# Patient Record
Sex: MALE | Race: WHITE | NOT HISPANIC OR LATINO | URBAN - METROPOLITAN AREA
[De-identification: names, ages, dates, MRNs, and addresses within clinical notes are randomized per-mention and may not be internally consistent; named-entity substitution may affect disease eponyms.]

---

## 2018-09-19 ENCOUNTER — NEW SKIN PROBLEM (OUTPATIENT)
Dept: URBAN - METROPOLITAN AREA CLINIC 19 | Facility: CLINIC | Age: 63
Setting detail: DERMATOLOGY
End: 2018-09-19

## 2018-09-19 ENCOUNTER — RX ONLY (RX ONLY)
Age: 63
End: 2018-09-19

## 2018-09-19 DIAGNOSIS — D49.2 NEOPLASM OF UNSPECIFIED BEHAVIOR OF BONE, SOFT TISSUE, AND SKIN: ICD-10-CM

## 2018-09-19 PROBLEM — L57.0 ACTINIC KERATOSIS: Status: RESOLVED | Noted: 2018-09-19

## 2018-09-19 PROCEDURE — 99242 OFF/OP CONSLTJ NEW/EST SF 20: CPT

## 2018-09-19 PROCEDURE — 17003 DESTRUCT PREMALG LES 2-14: CPT

## 2018-09-19 PROCEDURE — 17000 DESTRUCT PREMALG LESION: CPT

## 2018-09-19 RX ORDER — CICLOPIROX 1 G/100ML
1 APPLICATION SHAMPOO TOPICAL 2 TIMES WEEKLY
Qty: 120 | Refills: 3
Start: 2018-09-19

## 2018-09-19 RX ORDER — CLINDAMYCIN PHOSPHATE 10 MG/ML
1 ML SOLUTION TOPICAL BID
Qty: 60 | Refills: 3
Start: 2018-09-19

## 2018-11-21 ENCOUNTER — FOLLOW-UP (OUTPATIENT)
Dept: URBAN - METROPOLITAN AREA CLINIC 19 | Facility: CLINIC | Age: 63
Setting detail: DERMATOLOGY
End: 2018-11-21

## 2018-11-21 DIAGNOSIS — L57.0 ACTINIC KERATOSIS: ICD-10-CM

## 2018-11-21 PROBLEM — L82.1 OTHER SEBORRHEIC KERATOSIS: Status: RESOLVED | Noted: 2018-11-21

## 2018-11-21 PROCEDURE — 69100 BIOPSY OF EXTERNAL EAR: CPT

## 2018-11-21 PROCEDURE — 17003 DESTRUCT PREMALG LES 2-14: CPT

## 2018-11-21 PROCEDURE — 11100 BX SKIN SUBCUTANEOUS&/MUCOUS MEMBRANE 1 LESION: CPT

## 2018-11-21 PROCEDURE — 99213 OFFICE O/P EST LOW 20 MIN: CPT

## 2018-11-21 PROCEDURE — 17000 DESTRUCT PREMALG LESION: CPT

## 2019-01-03 ENCOUNTER — CRYOTHERAPY (OUTPATIENT)
Dept: URBAN - METROPOLITAN AREA CLINIC 19 | Facility: CLINIC | Age: 64
Setting detail: DERMATOLOGY
End: 2019-01-03

## 2019-01-03 DIAGNOSIS — L57.0 ACTINIC KERATOSIS: ICD-10-CM

## 2019-01-03 PROCEDURE — 17003 DESTRUCT PREMALG LES 2-14: CPT

## 2019-01-03 PROCEDURE — 17000 DESTRUCT PREMALG LESION: CPT

## 2019-11-20 ENCOUNTER — COMPLETE SKIN EXAM (OUTPATIENT)
Dept: URBAN - METROPOLITAN AREA CLINIC 19 | Facility: CLINIC | Age: 64
Setting detail: DERMATOLOGY
End: 2019-11-20

## 2019-11-20 DIAGNOSIS — Z48.02 ENCOUNTER FOR REMOVAL OF SUTURES: ICD-10-CM

## 2019-11-20 PROBLEM — L57.0 ACTINIC KERATOSIS: Status: RESOLVED | Noted: 2019-11-20

## 2019-11-20 PROBLEM — D18.01 HEMANGIOMA OF SKIN AND SUBCUTANEOUS TISSUE: Status: RESOLVED | Noted: 2019-11-20

## 2019-11-20 PROBLEM — L82.0 INFLAMED SEBORRHEIC KERATOSIS: Status: RESOLVED | Noted: 2019-11-20

## 2019-11-20 PROBLEM — L82.1 OTHER SEBORRHEIC KERATOSIS: Status: RESOLVED | Noted: 2019-11-20

## 2019-11-20 PROCEDURE — 11102 TANGNTL BX SKIN SINGLE LES: CPT

## 2019-11-20 PROCEDURE — 17003 DESTRUCT PREMALG LES 2-14: CPT

## 2019-11-20 PROCEDURE — 99213 OFFICE O/P EST LOW 20 MIN: CPT

## 2019-11-20 PROCEDURE — 17110 DESTRUCT B9 LESION 1-14: CPT

## 2019-11-20 PROCEDURE — 17000 DESTRUCT PREMALG LESION: CPT

## 2020-12-02 ENCOUNTER — COMPLETE SKIN EXAM (OUTPATIENT)
Dept: URBAN - METROPOLITAN AREA CLINIC 19 | Facility: CLINIC | Age: 65
Setting detail: DERMATOLOGY
End: 2020-12-02

## 2020-12-02 DIAGNOSIS — L57.0 ACTINIC KERATOSIS: ICD-10-CM

## 2020-12-02 PROBLEM — D18.01 HEMANGIOMA OF SKIN AND SUBCUTANEOUS TISSUE: Status: RESOLVED | Noted: 2020-12-02

## 2020-12-02 PROBLEM — L82.0 INFLAMED SEBORRHEIC KERATOSIS: Status: RESOLVED | Noted: 2020-12-02

## 2020-12-02 PROBLEM — L82.1 OTHER SEBORRHEIC KERATOSIS: Status: RESOLVED | Noted: 2020-12-02

## 2020-12-02 PROCEDURE — 99213 OFFICE O/P EST LOW 20 MIN: CPT

## 2020-12-02 PROCEDURE — 17000 DESTRUCT PREMALG LESION: CPT

## 2020-12-02 PROCEDURE — 17003 DESTRUCT PREMALG LES 2-14: CPT

## 2020-12-02 PROCEDURE — 17110 DESTRUCT B9 LESION 1-14: CPT

## 2022-01-10 ENCOUNTER — COMPLETE SKIN EXAM (OUTPATIENT)
Dept: URBAN - METROPOLITAN AREA 39 | Facility: CLINIC | Age: 67
Setting detail: DERMATOLOGY
End: 2022-01-10

## 2022-01-10 ENCOUNTER — RX ONLY (RX ONLY)
Age: 67
End: 2022-01-10

## 2022-01-10 DIAGNOSIS — Z41.8 ENCOUNTER FOR OTHER PROCEDURES FOR PURPOSES OTHER THAN REMEDYING HEALTH STATE: ICD-10-CM

## 2022-01-10 PROBLEM — D18.01 HEMANGIOMA OF SKIN AND SUBCUTANEOUS TISSUE: Status: RESOLVED | Noted: 2022-01-10

## 2022-01-10 PROBLEM — L57.0 ACTINIC KERATOSIS: Status: RESOLVED | Noted: 2022-01-10

## 2022-01-10 PROBLEM — L82.1 OTHER SEBORRHEIC KERATOSIS: Status: RESOLVED | Noted: 2022-01-10

## 2022-01-10 PROCEDURE — 99214 OFFICE O/P EST MOD 30 MIN: CPT

## 2022-01-10 PROCEDURE — 17003 DESTRUCT PREMALG LES 2-14: CPT

## 2022-01-10 PROCEDURE — 17000 DESTRUCT PREMALG LESION: CPT

## 2022-01-10 PROCEDURE — 17110 DESTRUCT B9 LESION 1-14: CPT

## 2022-01-10 RX ORDER — FLUOCINONIDE 0.5 MG/ML
SOLUTION TOPICAL
Qty: 60 | Refills: 7
Start: 2022-01-10

## 2023-01-10 ENCOUNTER — APPOINTMENT (OUTPATIENT)
Dept: URBAN - METROPOLITAN AREA SURGERY 13 | Age: 68
Setting detail: DERMATOLOGY
End: 2023-01-10

## 2023-01-10 DIAGNOSIS — L738 OTHER SPECIFIED DISEASES OF HAIR AND HAIR FOLLICLES: ICD-10-CM

## 2023-01-10 DIAGNOSIS — L663 OTHER SPECIFIED DISEASES OF HAIR AND HAIR FOLLICLES: ICD-10-CM

## 2023-01-10 DIAGNOSIS — L57.0 ACTINIC KERATOSIS: ICD-10-CM

## 2023-01-10 PROBLEM — L02.92 FURUNCLE, UNSPECIFIED: Status: ACTIVE | Noted: 2023-01-10

## 2023-01-10 PROCEDURE — OTHER ORDER TESTS: OTHER

## 2023-01-10 PROCEDURE — 99214 OFFICE O/P EST MOD 30 MIN: CPT | Mod: 25

## 2023-01-10 PROCEDURE — 17004 DESTROY PREMAL LESIONS 15/>: CPT

## 2023-01-10 PROCEDURE — OTHER PRESCRIPTION MEDICATION MANAGEMENT: OTHER

## 2023-01-10 PROCEDURE — OTHER LIQUID NITROGEN: OTHER

## 2023-01-10 PROCEDURE — OTHER COUNSELING: OTHER

## 2023-01-10 ASSESSMENT — LOCATION DETAILED DESCRIPTION DERM
LOCATION DETAILED: RIGHT MID TEMPLE
LOCATION DETAILED: LEFT SUPERIOR LATERAL MALAR CHEEK
LOCATION DETAILED: LEFT MEDIAL FOREHEAD
LOCATION DETAILED: LEFT SUPERIOR CENTRAL MALAR CHEEK
LOCATION DETAILED: RIGHT CLAVICULAR NECK
LOCATION DETAILED: RIGHT FOREHEAD
LOCATION DETAILED: RIGHT ANTIHELIX
LOCATION DETAILED: LEFT CLAVICULAR NECK
LOCATION DETAILED: LEFT LATERAL MALAR CHEEK
LOCATION DETAILED: LEFT LATERAL FOREHEAD
LOCATION DETAILED: LEFT ANTIHELIX
LOCATION DETAILED: LEFT LATERAL EYEBROW
LOCATION DETAILED: LEFT MID TEMPLE
LOCATION DETAILED: LEFT FOREHEAD

## 2023-01-10 ASSESSMENT — LOCATION SIMPLE DESCRIPTION DERM
LOCATION SIMPLE: LEFT TEMPLE
LOCATION SIMPLE: RIGHT TEMPLE
LOCATION SIMPLE: LEFT ANTERIOR NECK
LOCATION SIMPLE: RIGHT EAR
LOCATION SIMPLE: LEFT CHEEK
LOCATION SIMPLE: RIGHT ANTERIOR NECK
LOCATION SIMPLE: RIGHT FOREHEAD
LOCATION SIMPLE: LEFT EAR
LOCATION SIMPLE: LEFT EYEBROW
LOCATION SIMPLE: LEFT FOREHEAD

## 2023-01-10 ASSESSMENT — LOCATION ZONE DERM
LOCATION ZONE: EAR
LOCATION ZONE: FACE
LOCATION ZONE: NECK

## 2023-01-10 NOTE — PROCEDURE: COUNSELING
Patient Specific Counseling (Will Not Stick From Patient To Patient): Start CLN shampoo for Folliculitis
Detail Level: Zone

## 2023-01-10 NOTE — PROCEDURE: ORDER TESTS
Expected Date Of Service: 01/10/2023
Performing Laboratory: [id number]
Lab Facility: [id number]
Bill For Surgical Tray: no
Billing Type: Third-Party Bill

## 2023-01-10 NOTE — PROCEDURE: LIQUID NITROGEN
Number Of Freeze-Thaw Cycles: 1 freeze-thaw cycle
Render Post-Care Instructions In Note?: no
Consent: The patient's consent was obtained including but not limited to risks of crusting, scabbing, blistering, scarring, darker or lighter pigmentary change, recurrence, incomplete removal and infection.
Show Aperture Variable?: Yes
Application Tool (Optional): Liquid Nitrogen Sprayer
Post-Care Instructions: I reviewed with the patient in detail post-care instructions. Patient is to wear sunprotection, and avoid picking at any of the treated lesions. Pt may apply Vaseline to crusted or scabbing areas.
Duration Of Freeze Thaw-Cycle (Seconds): 5
Detail Level: Detailed

## 2023-01-10 NOTE — PROCEDURE: PRESCRIPTION MEDICATION MANAGEMENT
Render In Strict Bullet Format?: No
Plan: Chronic folliculitis that did not improve with topical clindamycin, ketaconazole shampoo, or fluocinonide. Will get results of bacterial culture today, then send appropriate oral antibiotic. Pt to call if he is not significantly better after 2 weeks of antibiotic use.
Detail Level: Zone

## 2023-01-16 ENCOUNTER — RX ONLY (RX ONLY)
Age: 68
End: 2023-01-16

## 2023-01-16 RX ORDER — CLINDAMYCIN HYDROCHLORIDE 300 MG/1
CAPSULE ORAL
Qty: 20 | Refills: 0 | Status: ERX

## 2023-01-16 RX ORDER — CLINDAMYCIN HYDROCHLORIDE 300 MG/1
CAPSULE ORAL
Qty: 20 | Refills: 0 | Status: ERX | COMMUNITY
Start: 2023-01-16

## 2023-01-16 RX ORDER — SULFAMETHOXAZOLE AND TRIMETHOPRIM 800; 160 MG/1; MG/1
TABLET ORAL
Qty: 28 | Refills: 0 | Status: CANCELLED

## 2023-02-07 ENCOUNTER — RX ONLY (RX ONLY)
Age: 68
End: 2023-02-07

## 2023-02-07 RX ORDER — CLINDAMYCIN HYDROCHLORIDE 300 MG/1
CAPSULE ORAL
Qty: 20 | Refills: 0 | Status: ERX

## 2023-04-28 ENCOUNTER — APPOINTMENT (OUTPATIENT)
Dept: URBAN - METROPOLITAN AREA SURGERY 13 | Age: 68
Setting detail: DERMATOLOGY
End: 2023-04-28

## 2023-04-28 DIAGNOSIS — L663 OTHER SPECIFIED DISEASES OF HAIR AND HAIR FOLLICLES: ICD-10-CM

## 2023-04-28 DIAGNOSIS — L738 OTHER SPECIFIED DISEASES OF HAIR AND HAIR FOLLICLES: ICD-10-CM

## 2023-04-28 PROBLEM — L02.92 FURUNCLE, UNSPECIFIED: Status: ACTIVE | Noted: 2023-04-28

## 2023-04-28 PROCEDURE — OTHER ADDITIONAL NOTES: OTHER

## 2023-04-28 PROCEDURE — OTHER PRESCRIPTION MEDICATION MANAGEMENT: OTHER

## 2023-04-28 PROCEDURE — OTHER PRESCRIPTION: OTHER

## 2023-04-28 PROCEDURE — 99214 OFFICE O/P EST MOD 30 MIN: CPT

## 2023-04-28 PROCEDURE — OTHER COUNSELING: OTHER

## 2023-04-28 RX ORDER — DOXYCYCLINE HYCLATE 100 MG/1
CAPSULE, GELATIN COATED ORAL QHS
Qty: 30 | Refills: 3 | Status: ERX | COMMUNITY
Start: 2023-04-28

## 2023-04-28 RX ORDER — CLINDAMYCIN PHOSPHATE 10 MG/ML
SOLUTION TOPICAL
Qty: 60 | Refills: 11 | Status: ERX | COMMUNITY
Start: 2023-04-28

## 2023-04-28 ASSESSMENT — LOCATION DETAILED DESCRIPTION DERM: LOCATION DETAILED: HAIR

## 2023-04-28 ASSESSMENT — LOCATION ZONE DERM: LOCATION ZONE: SCALP

## 2023-04-28 ASSESSMENT — LOCATION SIMPLE DESCRIPTION DERM: LOCATION SIMPLE: HAIR

## 2023-04-28 NOTE — PROCEDURE: PRESCRIPTION MEDICATION MANAGEMENT
Detail Level: Zone
Render In Strict Bullet Format?: No
Initiate Treatment: Doxycycline 100 mg qhs x 3 mos. Clindamycin topical solution qhs.
Discontinue Regimen: Clindamycin po. Clobetasol solution.

## 2023-04-28 NOTE — PROCEDURE: ADDITIONAL NOTES
Detail Level: Simple
Additional Notes: Culture + for staph. Finished clindamycin 300 mg bid x 1 week on, 1 week off from pcp.
Render Risk Assessment In Note?: no

## 2023-07-28 ENCOUNTER — APPOINTMENT (OUTPATIENT)
Dept: URBAN - METROPOLITAN AREA SURGERY 13 | Age: 68
Setting detail: DERMATOLOGY
End: 2023-07-28

## 2023-07-28 DIAGNOSIS — L738 OTHER SPECIFIED DISEASES OF HAIR AND HAIR FOLLICLES: ICD-10-CM

## 2023-07-28 DIAGNOSIS — L663 OTHER SPECIFIED DISEASES OF HAIR AND HAIR FOLLICLES: ICD-10-CM

## 2023-07-28 PROBLEM — L02.92 FURUNCLE, UNSPECIFIED: Status: ACTIVE | Noted: 2023-07-28

## 2023-07-28 PROCEDURE — 99213 OFFICE O/P EST LOW 20 MIN: CPT

## 2023-07-28 PROCEDURE — OTHER COUNSELING: OTHER

## 2023-07-28 PROCEDURE — OTHER PRESCRIPTION MEDICATION MANAGEMENT: OTHER

## 2023-07-28 ASSESSMENT — SEVERITY ASSESSMENT: SEVERITY: MILD

## 2023-07-28 ASSESSMENT — PAIN INTENSITY VAS: HOW INTENSE IS YOUR PAIN 0 BEING NO PAIN, 10 BEING THE MOST SEVERE PAIN POSSIBLE?: NO PAIN

## 2023-07-28 ASSESSMENT — BSA RASH: BSA RASH: 0

## 2023-07-28 NOTE — PROCEDURE: PRESCRIPTION MEDICATION MANAGEMENT
Continue Regimen: Clindamycin solution qd.
Detail Level: Zone
Render In Strict Bullet Format?: No
Discontinue Regimen: Doxycycline.

## 2023-11-30 ENCOUNTER — APPOINTMENT (OUTPATIENT)
Dept: URBAN - METROPOLITAN AREA SURGERY 13 | Age: 68
Setting detail: DERMATOLOGY
End: 2023-11-30

## 2023-11-30 DIAGNOSIS — L738 OTHER SPECIFIED DISEASES OF HAIR AND HAIR FOLLICLES: ICD-10-CM

## 2023-11-30 DIAGNOSIS — L663 OTHER SPECIFIED DISEASES OF HAIR AND HAIR FOLLICLES: ICD-10-CM

## 2023-11-30 DIAGNOSIS — L57.0 ACTINIC KERATOSIS: ICD-10-CM

## 2023-11-30 PROBLEM — L02.821 FURUNCLE OF HEAD [ANY PART, EXCEPT FACE]: Status: ACTIVE | Noted: 2023-11-30

## 2023-11-30 PROCEDURE — OTHER PRESCRIPTION MEDICATION MANAGEMENT: OTHER

## 2023-11-30 PROCEDURE — 17003 DESTRUCT PREMALG LES 2-14: CPT

## 2023-11-30 PROCEDURE — OTHER MIPS QUALITY: OTHER

## 2023-11-30 PROCEDURE — OTHER LIQUID NITROGEN: OTHER

## 2023-11-30 PROCEDURE — 99213 OFFICE O/P EST LOW 20 MIN: CPT | Mod: 25

## 2023-11-30 PROCEDURE — OTHER COUNSELING: OTHER

## 2023-11-30 PROCEDURE — 17000 DESTRUCT PREMALG LESION: CPT

## 2023-11-30 PROCEDURE — OTHER PRESCRIPTION: OTHER

## 2023-11-30 RX ORDER — DOXYCYCLINE HYCLATE 100 MG/1
CAPSULE, GELATIN COATED ORAL QHS
Qty: 30 | Refills: 1 | Status: ERX

## 2023-11-30 ASSESSMENT — LOCATION ZONE DERM
LOCATION ZONE: SCALP
LOCATION ZONE: FACE
LOCATION ZONE: SCALP
LOCATION ZONE: NECK
LOCATION ZONE: FACE

## 2023-11-30 ASSESSMENT — LOCATION SIMPLE DESCRIPTION DERM
LOCATION SIMPLE: RIGHT CHEEK
LOCATION SIMPLE: LEFT CHEEK
LOCATION SIMPLE: SCALP
LOCATION SIMPLE: LEFT ANTERIOR NECK
LOCATION SIMPLE: LEFT FOREHEAD
LOCATION SIMPLE: NECK
LOCATION SIMPLE: LEFT FOREHEAD
LOCATION SIMPLE: RIGHT FOREHEAD
LOCATION SIMPLE: SCALP

## 2023-11-30 ASSESSMENT — LOCATION DETAILED DESCRIPTION DERM
LOCATION DETAILED: LEFT INFERIOR LATERAL FOREHEAD
LOCATION DETAILED: RIGHT SUPERIOR PARIETAL SCALP
LOCATION DETAILED: RIGHT INFERIOR LATERAL FOREHEAD
LOCATION DETAILED: LEFT SUPERIOR LATERAL NECK
LOCATION DETAILED: LEFT SUPERIOR CENTRAL MALAR CHEEK
LOCATION DETAILED: LEFT SUPERIOR ANTERIOR NECK
LOCATION DETAILED: RIGHT SUPERIOR LATERAL MALAR CHEEK
LOCATION DETAILED: LEFT CENTRAL FRONTAL SCALP
LOCATION DETAILED: RIGHT SUPERIOR PARIETAL SCALP
LOCATION DETAILED: RIGHT INFERIOR CENTRAL MALAR CHEEK
LOCATION DETAILED: LEFT LATERAL FOREHEAD

## 2023-11-30 NOTE — PROCEDURE: MIPS QUALITY
Quality 111:Pneumonia Vaccination Status For Older Adults: Patient received any pneumococcal conjugate or polysaccharide vaccine on or after their 60th birthday and before the end of the measurement period
Quality 431: Preventive Care And Screening: Unhealthy Alcohol Use - Screening: Patient not identified as an unhealthy alcohol user when screened for unhealthy alcohol use using a systematic screening method
Detail Level: Detailed
Quality 47: Advance Care Plan: Advance Care Planning discussed and documented; advance care plan or surrogate decision maker documented in the medical record.
Quality 110: Preventive Care And Screening: Influenza Immunization: Influenza Immunization not Administered because Patient Refused.
Quality 226: Preventive Care And Screening: Tobacco Use: Screening And Cessation Intervention: Patient screened for tobacco use and is an ex/non-smoker

## 2023-11-30 NOTE — PROCEDURE: PRESCRIPTION MEDICATION MANAGEMENT
Render In Strict Bullet Format?: No
Detail Level: Zone
Continue Regimen: Clindamycin solution.
Initiate Treatment: Doxycycline 100 mg qhs x 2 weeks for flares.
No

## 2025-01-14 ENCOUNTER — RX ONLY (RX ONLY)
Age: 70
End: 2025-01-14

## 2025-01-14 ENCOUNTER — APPOINTMENT (OUTPATIENT)
Dept: RURAL SURGERY 2 | Age: 70
Setting detail: DERMATOLOGY
End: 2025-01-14

## 2025-01-14 DIAGNOSIS — L738 OTHER SPECIFIED DISEASES OF HAIR AND HAIR FOLLICLES: ICD-10-CM

## 2025-01-14 DIAGNOSIS — L57.0 ACTINIC KERATOSIS: ICD-10-CM

## 2025-01-14 DIAGNOSIS — L663 OTHER SPECIFIED DISEASES OF HAIR AND HAIR FOLLICLES: ICD-10-CM

## 2025-01-14 PROBLEM — L02.821 FURUNCLE OF HEAD [ANY PART, EXCEPT FACE]: Status: ACTIVE | Noted: 2025-01-14

## 2025-01-14 PROCEDURE — 17003 DESTRUCT PREMALG LES 2-14: CPT

## 2025-01-14 PROCEDURE — OTHER COUNSELING: OTHER

## 2025-01-14 PROCEDURE — 17000 DESTRUCT PREMALG LESION: CPT

## 2025-01-14 PROCEDURE — 99213 OFFICE O/P EST LOW 20 MIN: CPT | Mod: 25

## 2025-01-14 PROCEDURE — OTHER PRESCRIPTION: OTHER

## 2025-01-14 PROCEDURE — OTHER LIQUID NITROGEN: OTHER

## 2025-01-14 PROCEDURE — OTHER PRESCRIPTION MEDICATION MANAGEMENT: OTHER

## 2025-01-14 RX ORDER — CLINDAMYCIN PHOSPHATE 10 MG/ML
SOLUTION TOPICAL
Qty: 180 | Refills: 3 | Status: ERX

## 2025-01-14 RX ORDER — DOXYCYCLINE HYCLATE 100 MG/1
CAPSULE, GELATIN COATED ORAL
Qty: 14 | Refills: 1 | Status: ERX

## 2025-01-14 ASSESSMENT — LOCATION DETAILED DESCRIPTION DERM
LOCATION DETAILED: RIGHT FOREHEAD
LOCATION DETAILED: LEFT INFERIOR CENTRAL MALAR CHEEK
LOCATION DETAILED: NASAL DORSUM
LOCATION DETAILED: RIGHT INFERIOR CENTRAL MALAR CHEEK
LOCATION DETAILED: LEFT SUPERIOR LATERAL MALAR CHEEK
LOCATION DETAILED: RIGHT SUPERIOR PARIETAL SCALP
LOCATION DETAILED: LEFT FOREHEAD
LOCATION DETAILED: LEFT MEDIAL FRONTAL SCALP

## 2025-01-14 ASSESSMENT — LOCATION SIMPLE DESCRIPTION DERM
LOCATION SIMPLE: LEFT CHEEK
LOCATION SIMPLE: LEFT FOREHEAD
LOCATION SIMPLE: RIGHT FOREHEAD
LOCATION SIMPLE: SCALP
LOCATION SIMPLE: RIGHT CHEEK
LOCATION SIMPLE: LEFT SCALP
LOCATION SIMPLE: NOSE

## 2025-01-14 ASSESSMENT — LOCATION ZONE DERM
LOCATION ZONE: NOSE
LOCATION ZONE: FACE
LOCATION ZONE: SCALP

## 2025-01-14 NOTE — HPI: UPPER BODY SKIN CHECK
What Is The Reason For Today's Visit?: Upper Body Skin Exam
Additional History: Pt would like refills on doxycycline and clindamycin to have on hand if his folliculitis occurs. Happens very occasionally.

## 2025-01-14 NOTE — PROCEDURE: PRESCRIPTION MEDICATION MANAGEMENT
Modify Regimen: Doxycycline 100 mg daily by mouth x 1 week for flares.
Render In Strict Bullet Format?: No
Detail Level: Zone
Continue Regimen: Clindamycin solution.